# Patient Record
Sex: FEMALE | Race: WHITE
[De-identification: names, ages, dates, MRNs, and addresses within clinical notes are randomized per-mention and may not be internally consistent; named-entity substitution may affect disease eponyms.]

---

## 2019-01-25 ENCOUNTER — HOSPITAL ENCOUNTER (OUTPATIENT)
Dept: HOSPITAL 61 - KCIC DEXA | Age: 59
Discharge: HOME | End: 2019-01-25
Attending: OBSTETRICS & GYNECOLOGY
Payer: COMMERCIAL

## 2019-01-25 DIAGNOSIS — Z12.31: Primary | ICD-10-CM

## 2019-01-25 DIAGNOSIS — Z13.820: ICD-10-CM

## 2019-01-25 DIAGNOSIS — Z78.0: ICD-10-CM

## 2019-01-25 DIAGNOSIS — Z79.890: ICD-10-CM

## 2019-01-25 PROCEDURE — 77067 SCR MAMMO BI INCL CAD: CPT

## 2019-01-25 PROCEDURE — 77063 BREAST TOMOSYNTHESIS BI: CPT

## 2019-01-25 PROCEDURE — 77080 DXA BONE DENSITY AXIAL: CPT

## 2019-01-25 NOTE — [facility]
Bilateral digital screening mammograms with 3-D tomosynthesis:

 

Reason for examination: Routine screening.

 

Comparison is made to previous studies dated 7/29/2016 and 5/8/2012.

 

Bilateral mammograms in CC and oblique projections were obtained with 2-D 

imaging and 3-D tomosynthesis imaging on a Siemens Inspiration unit and 

reviewed on the workstation. Interpretation was made with the benefit of 

CAD.

 

The skin and nipples show no abnormalities. No abnormal axillary lymph 

nodes are seen. The breast parenchyma shows scattered fatty and 

fibroglandular density. (Breast density: Category B.) There are no 

dominant masses, suspicious calcifications or architectural distortion. 

Benign calcifications are seen.

 

Impression:

 

No evidence of malignancy. Recommend routine screening.

 

BI-RAD Category 2: Benign.

 

"Our facility is accredited by the [employer] 

Mammography Program."

 

This patient's information has been entered into a reminder system for the

patient to be notified with the results of her examination and a target 

date for the next mammogram.

 

Electronically signed by: Guillermina Connors MD (1/25/2019 5:36 PM) [id number]

## 2019-01-26 NOTE — [facility]
Indication: Postmenopausal screening for osteoporosis. Hormone replacement

therapy..

 

COMPARISON: None available.

 

Bone Density:

-BMD:

(g/cm2)

- AP Spine Total (L1-L4).......... 1.035.

- Total left Hip................. 0.903. 

T-Score:

- AP Spine Total (L1-L4)......... -0.1.

- Total left Hip................. -0.3. 

Z-Score:

- AP Spine Total (L1-L4).......... 1.3.

- Total left Hip................. 0.6. 

World Health Organization criteria for BMD interpretation classify 

patients as Normal (T-score at or above -1.0), Osteopenic (T-score between

-1.0 and -2.5), or Osteoporotic (T-score at or below -2.5).

 

 

Impression:

1. AP Spine Total L1-L4--- normal.

2. Total left Hip--- normal.

 

Electronically signed by: Chan Kim MD (1/26/2019 7:50 AM) [facility]

## 2021-02-09 ENCOUNTER — HOSPITAL ENCOUNTER (OUTPATIENT)
Dept: HOSPITAL 61 - KCIC MAMMO | Age: 61
End: 2021-02-09
Attending: OBSTETRICS & GYNECOLOGY
Payer: COMMERCIAL

## 2021-02-09 DIAGNOSIS — Z12.31: Primary | ICD-10-CM

## 2021-02-09 PROCEDURE — 77067 SCR MAMMO BI INCL CAD: CPT

## 2021-02-09 PROCEDURE — 77063 BREAST TOMOSYNTHESIS BI: CPT

## 2021-02-09 NOTE — [facility]
EXAM: Bilateral digital screening mammogram with tomosynthesis.



HISTORY: 60-year-old female presents for screening mammography.



TECHNIQUE: Full-field digital craniocaudal and mediolateral oblique 2D and 3D tomosynthesis images of
 both breasts are obtained for evaluation. Computer aided detection was applied.



COMPARISON: 1/25/2019



BREAST PARENCHYMAL DENSITY: Level B - Scattered fibroglandular densities.



FINDINGS: There is asymmetry within the anterior 6:00 position the right breast in the craniocaudal p
rojection. The absence of a correlate in the mediolateral oblique projections favors summation artifa
ct. There are few additional areas of asymmetry which are stable in appearance. There is no 
ural distortion or suspicious calcification.



IMPRESSION: BI-RADS Category 0: Additional imaging needed.



RECOMMENDATION: Further evaluation with a full field true lateral view and spot compression craniocau
jessica view of the right breast to assess asymmetry at the anterior 6:00 position is recommended. Sonogr
aphic imaging can also be performed if deemed indicated based on additional mammographic findings.



If your mammogram demonstrates that you have dense breast tissue, which could hide abnormalities, and
 if you have other risk factors for breast cancer that have been identified, you might benefit from s
upplemental screening tests that may be suggested by your ordering physician.  Dense breast tissue, i
n and of itself, is a relatively common condition.  This information is not provided to cause undue c
oncern, but rather to raise your awareness and to promote discussion with your physician regarding th
e presence of other risk factors, in addition to dense breast tissue. A report of your mammography re
sults will be sent to you and your physician.  You should contact your physician if you have any ques
tions or concerns regarding this report.  



Mammography is a sensitive method for finding small breast cancers, but it does not detect them all a
nd is not a substitute for careful clinical examination.  A negative mammogram does not negate a clin
ically suspicious finding and should not result in delay in biopsying a clinically suspicious abnorma
lity.



PQRS compliance statement -  Patient information was entered into a reminder system with a target due
 date for the next mammogram. 



"Our facility is accredited by the American [employer] Mammography Program."



Electronically signed by: Kyra Tran MD (2/9/2021 4:04 PM) [place] [id number]

## 2021-03-02 ENCOUNTER — HOSPITAL ENCOUNTER (OUTPATIENT)
Dept: HOSPITAL 61 - KCIC MAMMO | Age: 61
End: 2021-03-02
Attending: OBSTETRICS & GYNECOLOGY
Payer: COMMERCIAL

## 2021-03-02 DIAGNOSIS — N60.41: Primary | ICD-10-CM

## 2021-03-02 PROCEDURE — 76641 ULTRASOUND BREAST COMPLETE: CPT

## 2021-03-02 PROCEDURE — 77065 DX MAMMO INCL CAD UNI: CPT

## 2021-03-02 NOTE — [facility]
Right breast diagnostic digital mammograms:



Reason for examination: Follow-up parenchymal asymmetry.



Comparison is made to previous study dated 2/9/2021.



True lateral and coned compression CC views were obtained of the right breast for further evaluation 
of some nodular asymmetry seen on screening.



Interpretation was made with the benefit of CAD.



The skin and nipple show no abnormalities. No abnormal axillary lymph nodes are seen. The breast pare
nchyma shows scattered fibroglandular density. (Breast density: Category B.) There are no dominant ma
sses, suspicious calcifications or architectural distortions.



Impression:



No evidence of malignancy. Ultrasound to follow.



BI-RADS Category 0: Incomplete. Needs additional imaging evaluation.





Right breast ultrasound:



Ultrasound examination was performed in the ear mammographic concern anteriorly and at the axilla.



There is ductal ectasia with some cystic ductal ectasia present. There are no other cystic or solid n
odules. No abnormal appearing lymph nodes are seen in the right axilla.



IMPRESSION:



Ductal ectasia in the retroareolar 6:00 position of the right breast. No other focal abnormality seen
. Recommend routine mammographic follow-up.



BI-RADS Category 2:  Benign.



"Our facility is accredited by the [employer] Mammography Program."



This patient's information has been entered into a reminder system for the patient to be notified wit
h the results of her examination and a target date for the next mammogram.



Electronically signed by: Guillermina Connors MD (3/2/2021 1:31 PM) [id number]